# Patient Record
Sex: FEMALE | Employment: UNEMPLOYED | URBAN - METROPOLITAN AREA
[De-identification: names, ages, dates, MRNs, and addresses within clinical notes are randomized per-mention and may not be internally consistent; named-entity substitution may affect disease eponyms.]

---

## 2017-02-09 ENCOUNTER — OFFICE VISIT (OUTPATIENT)
Dept: INTERNAL MEDICINE CLINIC | Age: 42
End: 2017-02-09

## 2017-02-09 VITALS
HEIGHT: 62 IN | DIASTOLIC BLOOD PRESSURE: 98 MMHG | HEART RATE: 74 BPM | SYSTOLIC BLOOD PRESSURE: 128 MMHG | RESPIRATION RATE: 16 BRPM | WEIGHT: 185 LBS | TEMPERATURE: 98.2 F | BODY MASS INDEX: 34.04 KG/M2 | OXYGEN SATURATION: 100 %

## 2017-02-09 DIAGNOSIS — R22.9 SINGLE SKIN NODULE: Primary | ICD-10-CM

## 2017-02-09 DIAGNOSIS — R03.0 ELEVATED BLOOD PRESSURE READING WITHOUT DIAGNOSIS OF HYPERTENSION: ICD-10-CM

## 2017-02-09 DIAGNOSIS — E66.9 OBESITY, UNSPECIFIED OBESITY SEVERITY, UNSPECIFIED OBESITY TYPE: ICD-10-CM

## 2017-02-09 DIAGNOSIS — Z32.02 NEGATIVE PREGNANCY TEST: ICD-10-CM

## 2017-02-09 NOTE — PATIENT INSTRUCTIONS

## 2017-02-09 NOTE — PROGRESS NOTES
Patient is in the office today for bump on face, referral to dermatology. Patient would like a pregnancy test    Do you have an Advance Directive yes  Do you want more information no    1. Have you been to the ER, urgent care clinic since your last visit? Hospitalized since your last visit? no    2. Have you seen or consulted any other health care providers outside of the [address] [place] [address] since your last visit? Include any pap smears or colon screening.  Yes Dr. Aurelio Freedman

## 2017-02-09 NOTE — MR AVS SNAPSHOT
Visit Information Date & Time Provider Department Dept. Phone Encounter #  
 2/9/2017  4:00 PM Franny Tinajero DO [facility] [employer] [phone] Follow-up Instructions Return in about 2 weeks (around 2/23/2017) for follow up blood pressure. Upcoming Health Maintenance Date Due DTaP/Tdap/Td series (1 - Tdap) 6/16/1997 PAP AKA CERVICAL CYTOLOGY 6/16/1997 INFLUENZA AGE 9 TO ADULT 8/1/2016 Allergies as of 2/9/2017  Review Complete On: 2/9/2017 By: Nhan Sanches  
 No Known Allergies Current Immunizations  Never Reviewed No immunizations on file. Not reviewed this visit You Were Diagnosed With   
  
 Codes Comments Single skin nodule    -  Primary ICD-10-CM: R22.9 ICD-9-CM: 782.2 Elevated blood pressure reading without diagnosis of hypertension     ICD-10-CM: R03.0 ICD-9-CM: 796.2 Obesity, unspecified obesity severity, unspecified obesity type     ICD-10-CM: E66.9 ICD-9-CM: 278.00 Negative pregnancy test     ICD-10-CM: Z32.02 
ICD-9-CM: V72.41 Vitals BP Pulse Temp Resp Height(growth percentile) Weight(growth percentile) (!) 128/98 (BP 1 Location: Left arm, BP Patient Position: Sitting) 74 98.2 °F (36.8 °C) (Oral) 16 5' 2\" (1.575 m) 185 lb (83.9 kg) SpO2 BMI OB Status Smoking Status 100% 33.84 kg/m2 Having regular periods Never Smoker Vitals History BMI and BSA Data Body Mass Index Body Surface Area  
 33.84 kg/m 2 1.92 m 2 Your Updated Medication List  
  
   
This list is accurate as of: 2/9/17  4:35 PM.  Always use your most recent med list.  
  
  
  
  
 cabergoline 0.5 mg tablet Commonly known as:  DOSTINEX Take 0.5 mg by mouth two (2) times a week. 1/2 tab    Given 6/23/16  
  
 multivitamin tablet Commonly known as:  ONE A DAY Take 1 Tab by mouth daily. VITAMIN C 1,000 mg tablet Generic drug:  ascorbic acid (vitamin C) Take  by mouth. VITAMIN D3 2,000 unit Tab Generic drug:  cholecalciferol (vitamin D3) Take  by mouth. We Performed the Following REFERRAL TO DERMATOLOGY [REF19 Custom] Follow-up Instructions Return in about 2 weeks (around 2/23/2017) for follow up blood pressure. Referral Information Referral ID Referred By Referred To  
  
 [id number] Lul MATIAS Not Available Visits Status Start Date End Date 1 New Request 2/9/17 2/9/18 If your referral has a status of pending review or denied, additional information will be sent to support the outcome of this decision. Patient Instructions A Healthy Lifestyle: Care Instructions Your Care Instructions A healthy lifestyle can help you feel good, stay at a healthy weight, and have plenty of energy for both work and play. A healthy lifestyle is something you can share with your whole family. A healthy lifestyle also can lower your risk for serious health problems, such as high blood pressure, heart disease, and diabetes. You can follow a few steps listed below to improve your health and the health of your family. Follow-up care is a key part of your treatment and safety. Be sure to make and go to all appointments, and call your doctor if you are having problems. Its also a good idea to know your test results and keep a list of the medicines you take. How can you care for yourself at home? · Do not eat too much sugar, fat, or fast foods. You can still have dessert and treats now and then. The goal is moderation. · Start small to improve your eating habits. Pay attention to portion sizes, drink less juice and soda pop, and eat more fruits and vegetables. ¨ Eat a healthy amount of food. A 3-ounce serving of meat, for example, is about the size of a deck of cards. Fill the rest of your plate with vegetables and whole grains. ¨ Limit the amount of soda and sports drinks you have every day.  Drink more water when you are thirsty. ¨ Eat at least 5 servings of fruits and vegetables every day. It may seem like a lot, but it is not hard to reach this goal. A serving or helping is 1 piece of fruit, 1 cup of vegetables, or 2 cups of leafy, raw vegetables. Have an apple or some carrot sticks as an afternoon snack instead of a candy bar. Try to have fruits and/or vegetables at every meal. 
· Make exercise part of your daily routine. You may want to start with simple activities, such as walking, bicycling, or slow swimming. Try to be active 30 to 60 minutes every day. You do not need to do all 30 to 60 minutes all at once. For example, you can exercise 3 times a day for 10 or 20 minutes. Moderate exercise is safe for most people, but it is always a good idea to talk to your doctor before starting an exercise program. 
· Keep moving. Anita Ramsey the lawn, work in the garden, or Girl Meets Dress. Take the stairs instead of the elevator at work. · If you smoke, quit. People who smoke have an increased risk for heart attack, stroke, cancer, and other lung illnesses. Quitting is hard, but there are ways to boost your chance of quitting tobacco for good. ¨ Use nicotine gum, patches, or lozenges. ¨ Ask your doctor about stop-smoking programs and medicines. ¨ Keep trying. In addition to reducing your risk of diseases in the future, you will notice some benefits soon after you stop using tobacco. If you have shortness of breath or asthma symptoms, they will likely get better within a few weeks after you quit. · Limit how much alcohol you drink. Moderate amounts of alcohol (up to 2 drinks a day for men, 1 drink a day for women) are okay. But drinking too much can lead to liver problems, high blood pressure, and other health problems. Family health If you have a family, there are many things you can do together to improve your health. · Eat meals together as a family as often as possible. · Eat healthy foods. This includes fruits, vegetables, lean meats and dairy, and whole grains. · Include your family in your fitness plan. Most people think of activities such as jogging or tennis as the way to fitness, but there are many ways you and your family can be more active. Anything that makes you breathe hard and gets your heart pumping is exercise. Here are some tips: 
¨ Walk to do errands or to take your child to school or the bus. ¨ Go for a family bike ride after dinner instead of watching TV. Where can you learn more? Go to http://[employer] Groupattila.info/. Enter Q024 in the search box to learn more about \"A Healthy Lifestyle: Care Instructions. \" Current as of: July 26, 2016 Content Version: 11.1 © 5526-8105 [employer]. Care instructions adapted under license by [employer] (which disclaims liability or warranty for this information). If you have questions about a medical condition or this instruction, always ask your healthcare professional. [place] [address] any warranty or liability for your use of this information. Introducing [address]! Mika Butts introduces [employer] patient portal. Now you can access parts of your medical record, email your doctor's office, and request medication refills online. 1. In your internet browser, go to [email or web address] 2. Click on the First Time User? Click Here link in the Sign In box. You will see the New Member Sign Up page. 3. Enter your [employer] Access Code exactly as it appears below. You will not need to use this code after youve completed the sign-up process. If you do not sign up before the expiration date, you must request a new code. · [employer] Access Code: [email or web address] Expires: 5/10/2017  4:35 PM 
 
4. Enter the last four digits of your Social Security Number (xxxx) and Date of Birth (mm/dd/yyyy) as indicated and click Submit.  You will be taken to the next sign-up page. 5. Create a [employer] ID. This will be your [employer] login ID and cannot be changed, so think of one that is secure and easy to remember. 6. Create a [employer] password. You can change your password at any time. 7. Enter your Password Reset Question and Answer. This can be used at a later time if you forget your password. 8. Enter your e-mail address. You will receive e-mail notification when new information is available in [address]. 9. Click Sign Up. You can now view and download portions of your medical record. 10. Click the Download Summary menu link to download a portable copy of your medical information. If you have questions, please visit the Frequently Asked Questions section of the [employer] website. Remember, [employer] is NOT to be used for urgent needs. For medical emergencies, dial 911. Now available from your iPhone and Android! Please provide this summary of care documentation to your next provider. If you have any questions after today's visit, please call [phone].

## 2017-02-14 NOTE — PROGRESS NOTES
HISTORY OF PRESENT ILLNESS  Luisito Perdomo is a 37 y.o. female. HPI  37year old established female patient in today for a bump on her face. She is requesting a derm referral.  She says she has had this for > 1 year but has noticed it growing. This is concerning to her. Nodule is non tender per her. She also desires urine pregnancy for concern of pregnancy. She reports that dispite following an appropriate diet she has had some weight increase and fatigue over the past few weeks. 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 1/2 tab    Given 16     ascorbic acid, vitamin C, (VITAMIN C) 1,000 mg tablet Take  by mouth.  cholecalciferol, vitamin D3, (VITAMIN D3) 2,000 unit tab Take  by mouth.  multivitamin (ONE A DAY) tablet Take 1 Tab by mouth daily. No current facility-administered medications for this visit. Past Surgical History   Procedure Laterality Date    Hx gyn        x 1       Review of Systems   Eyes: Negative for blurred vision. Cardiovascular: Negative for chest pain, palpitations and leg swelling. Neurological: Negative for headaches.    See HPI    Visit Vitals    BP (!) 128/98 (BP 1 Location: Left arm, BP Patient Position: Sitting)    Pulse 74    Temp 98.2 °F (36.8 °C) (Oral)    Resp 16    Ht 5' 2\" (1.575 m)    Wt 185 lb (83.9 kg)    SpO2 100%    BMI 33.84 kg/m2     Physical Exam   Constitutional: She appears well-developed and well-nourished. Skin:   Left lower jaw with firm, mobile, nontender pea sized nodule. Psychiatric: She has a normal mood and affect. ASSESSMENT and PLAN    ICD-10-CM ICD-9-CM    1. Single skin nodule R22.9 782.2 REFERRAL TO DERMATOLOGY   2. Elevated blood pressure reading without diagnosis of hypertension R03.0 796.2    3. Obesity, unspecified obesity severity, unspecified obesity type E66.9 278.00    4. Negative pregnancy test Z32.02 V72.41      -Patient declined to discuss ways to improve and maximize meals today. -RTC 2 weeks for follow up on BP    -Patient classified as obese  -Advised continued exercise and dietary modifications.    -Patient agrees with assessment and plan    According to the CDC an increased BMI can lead to \"all-causes of death (mortality), High blood pressure (Hypertension), High LDL cholesterol, low HDL cholesterol, or high levels of triglycerides (Dyslipidemia), Type 2 diabetes, Coronary heart disease, Stroke, Gallbladder disease, Osteoarthritis (a breakdown of cartilage and bone within a joint), Sleep apnea and breathing problems, Chronic inflammation and increased oxidative stress, some cancers (endometrial, breast, colon, kidney, gallbladder, and liver), Low quality of life, Mental illness such as clinical depression, anxiety, and other mental disorders and Body pain and difficulty with physical functioning. \"    BMI Weight Status   Below 18.5 Underweight   18.5  24.9 Normal or Healthy Weight   25.0  29.9 Overweight   30.0 and Above Obese   40.0 and Above Morbid Obesity     Additional Instructions: The patient understands that they should contact the office at any time if any questions or concerns develop.   They are also aware that they can call our main office number at [phone] at any time if they would like to address any concerns with the physician. They also understand that they should dial 911 if any acute emergency arises. The patient understands that they should give us a minimum of 48 hours to complete prescription refills once they are requested. The patient has also been instructed to contact us by calling the main office number if they have not received feedback within 2 weeks of having any tests completed. The patient is a aware that they should read all package insert information when picking up the medications and that they should consult the pharmacist of a physician if they have any questions or concerns regarding the prescribed medications. Discussed with the patient new medications given and patient instructed to read pharmacy literature regarding side effects and drug interactions. Instructions for taking the medications were provided to the patient and the consequences of not taking it. Follow-up Disposition:   Return if symptoms worsen or fail to improve. Risk and benefits of new medication discussed in detail when indicated, patient was given the opportunity to ask questions   AVS provided  reviewed diet, exercise and weight control when indicated  Alarm signals discussed. ER precautions reviewed when indicated  Plan of care reviewed with patient. Understanding verbalized and they are in agreement with plan of care.      Link Shorts, DO

## 2019-09-25 ENCOUNTER — OFFICE VISIT (OUTPATIENT)
Dept: FAMILY MEDICINE CLINIC | Age: 44
End: 2019-09-25

## 2019-09-25 ENCOUNTER — HOSPITAL ENCOUNTER (OUTPATIENT)
Dept: LAB | Age: 44
Discharge: HOME | End: 2019-09-25
Payer: COMMERCIAL

## 2019-09-25 VITALS
TEMPERATURE: 98.2 F | SYSTOLIC BLOOD PRESSURE: 136 MMHG | HEIGHT: 62 IN | DIASTOLIC BLOOD PRESSURE: 92 MMHG | WEIGHT: 153.2 LBS | HEART RATE: 76 BPM | BODY MASS INDEX: 28.19 KG/M2 | RESPIRATION RATE: 16 BRPM | OXYGEN SATURATION: 100 %

## 2019-09-25 DIAGNOSIS — Z13.1 SCREENING FOR DIABETES MELLITUS: ICD-10-CM

## 2019-09-25 DIAGNOSIS — Z00.00 WELL WOMAN EXAM (NO GYNECOLOGICAL EXAM): Primary | ICD-10-CM

## 2019-09-25 DIAGNOSIS — Z13.220 SCREENING FOR HYPERLIPIDEMIA: ICD-10-CM

## 2019-09-25 DIAGNOSIS — Z13.29 SCREENING FOR THYROID DISORDER: ICD-10-CM

## 2019-09-25 DIAGNOSIS — Z13.0 SCREENING FOR DEFICIENCY ANEMIA: ICD-10-CM

## 2019-09-25 PROBLEM — N81.4 UTERINE PROLAPSE: Status: ACTIVE | Noted: 2018-12-04

## 2019-09-25 LAB
ALBUMIN SERPL-MCNC: 4 G/DL (ref 3.4–5)
ALBUMIN/GLOB SERPL: 1.2 {RATIO} (ref 0.8–1.7)
ALP SERPL-CCNC: 49 U/L (ref 45–117)
ALT SERPL-CCNC: 19 U/L (ref 13–56)
ANION GAP SERPL CALC-SCNC: 6 MMOL/L (ref 3–18)
AST SERPL-CCNC: 13 U/L (ref 10–38)
BILIRUB SERPL-MCNC: 0.5 MG/DL (ref 0.2–1)
BUN SERPL-MCNC: 16 MG/DL (ref 7–18)
BUN/CREAT SERPL: 22 (ref 12–20)
CALCIUM SERPL-MCNC: 9.8 MG/DL (ref 8.5–10.1)
CHLORIDE SERPL-SCNC: 102 MMOL/L (ref 100–111)
CO2 SERPL-SCNC: 30 MMOL/L (ref 21–32)
CREAT SERPL-MCNC: 0.72 MG/DL (ref 0.6–1.3)
GLOBULIN SER CALC-MCNC: 3.3 G/DL (ref 2–4)
GLUCOSE SERPL-MCNC: 96 MG/DL (ref 74–99)
HCT VFR BLD AUTO: 32.5 % (ref 35–45)
HGB BLD-MCNC: 10.5 G/DL (ref 12–16)
POTASSIUM SERPL-SCNC: 4.4 MMOL/L (ref 3.5–5.5)
PROT SERPL-MCNC: 7.3 G/DL (ref 6.4–8.2)
SODIUM SERPL-SCNC: 138 MMOL/L (ref 136–145)
TSH SERPL DL<=0.05 MIU/L-ACNC: 0.83 UIU/ML (ref 0.36–3.74)

## 2019-09-25 PROCEDURE — 84443 ASSAY THYROID STIM HORMONE: CPT

## 2019-09-25 PROCEDURE — 36415 COLL VENOUS BLD VENIPUNCTURE: CPT

## 2019-09-25 PROCEDURE — 85018 HEMOGLOBIN: CPT

## 2019-09-25 PROCEDURE — 80061 LIPID PANEL: CPT

## 2019-09-25 PROCEDURE — 80053 COMPREHEN METABOLIC PANEL: CPT

## 2019-09-25 NOTE — PROGRESS NOTES
Pt is here for well woman w/o pap    1. Have you been to the ER, urgent care clinic since your last visit? Hospitalized since your last visit? No    2. Have you seen or consulted any other health care providers outside of the [address] [place] [address] since your last visit? Include any pap smears or colon screening. No      Subjective:   38 y.o. female for Well Woman Check. Her gyne and breast care is done elsewhere by her Ob-Gyne physician. Patient Active Problem List    Diagnosis Date Noted    Elevated blood pressure reading without diagnosis of hypertension 2017    Hyperprolactinemia ([facility] [address] 2016    Obesity 2016     Current Outpatient Medications   Medication Sig Dispense Refill    cholecalciferol, vitamin D3, (VITAMIN D3) 2,000 unit tab Take  by mouth. 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 No TIA's or unusual headaches, no dysphagia. No prolonged cough. No dyspnea or chest pain on exertion. No abdominal pain, change in bowel habits, black or bloody stools. No urinary tract symptoms. No new or unusual musculoskeletal symptoms. Specific concerns today: none. Objective: The patient appears well, alert, oriented x 3, in no distress.   Visit Vitals  BP (!) 136/92 (BP 1 Location: Left arm)   Pulse 76   Temp 98.2 °F (36.8 °C) (Oral)   Resp 16   Ht 5' 2\" (1.575 m)   Wt 153 lb 3.2 oz (69.5 kg)   LMP 2019 (Exact Date)   SpO2 100%   BMI 28.02 kg/m²     ENT normal.  Neck supple. No adenopathy or thyromegaly. LORA. Lungs are clear, good air entry, no wheezes, rhonchi or rales. S1 and S2 normal, no murmurs, regular rate and rhythm. Abdomen soft without tenderness, guarding, mass or organomegaly. Extremities show no edema, normal peripheral pulses. Neurological is normal, no focal findings. Breast and Pelvic exams are deferred. Assessment/Plan:   Well Woman  routine labs ordered    ICD-10-CM ICD-9-CM    1. Well woman exam (no gynecological exam) Z00.00 V70.0     [V70.0]   2. Screening for deficiency anemia Z13.0 V78.1    3. Screening for thyroid disorder Z13.29 V77.0    4. Screening for hyperlipidemia Z13.220 V77.91    5. Screening for diabetes mellitus Z13.1 V77.1      PLAN:  We discussed screening recommendations. Pt declined the mammogram.  Pt declined immunizations. I have discussed the diagnosis with the patient and the intended plan as seen in the above orders. The patient has received an after-visit summary and questions were answered concerning future plans. I have discussed medication side effects and warnings with the patient as well. Patient will call for further questions. Follow-up and Dispositions    · Return in about 1 year (around 9/25/2020) for Mellisa Mccullough.

## 2019-09-26 LAB
CHOLEST SERPL-MCNC: 224 MG/DL
HDLC SERPL-MCNC: 81 MG/DL (ref 40–60)
HDLC SERPL: 2.8 {RATIO} (ref 0–5)
LDLC SERPL CALC-MCNC: 131.4 MG/DL (ref 0–100)
LIPID PROFILE,FLP: ABNORMAL
TRIGL SERPL-MCNC: 58 MG/DL (ref ?–150)
VLDLC SERPL CALC-MCNC: 11.6 MG/DL

## 2019-09-27 ENCOUNTER — TELEPHONE (OUTPATIENT)
Dept: FAMILY MEDICINE CLINIC | Age: 44
End: 2019-09-27

## 2019-09-27 NOTE — TELEPHONE ENCOUNTER
Pt states she would like to be referred to physical therapy as she has a prolaspe. She states she discussed it with her GYN and they gave her the options of surgery or doing exercises at home. Pt advised to call her GYN to ask for the referral but states she would rather get the referral from Mrs Tevin Pitts. Pt aware Jeanice Epley is out of the office until Wednesday.

## 2019-10-11 NOTE — TELEPHONE ENCOUNTER
Bry Means, NP  You 2 hours ago (8:55 AM)      Please ask Pt to call her GYN as I do no know who does physical therapy for this. If she can give me a name, I will order this for her. Pt aware to ask her GYN for a name who does PT and then call back with the name & Cristina Lovell will put in an order.

## 2019-10-25 ENCOUNTER — TELEPHONE (OUTPATIENT)
Dept: FAMILY MEDICINE CLINIC | Age: 44
End: 2019-10-25

## 2019-10-25 NOTE — TELEPHONE ENCOUNTER
Patient called to let Kassidy Castle know that she called her gyn and got the name of a PT that will do therapy for prolapse. She called [facility] at their [place] location, and will schedule appointment after receiving a order from Kassidy Castle for physical therapy. [facility] [place] phone number is [phone].

## 2019-10-29 DIAGNOSIS — N81.4 UTERINE PROLAPSE: Primary | ICD-10-CM

## 2019-10-30 NOTE — TELEPHONE ENCOUNTER
Alfonso Martínez NP  You 15 hours ago (5:32 PM)      Ref placed    Routing comment        Pt aware referral placed.

## 2019-11-27 ENCOUNTER — HOSPITAL ENCOUNTER (OUTPATIENT)
Dept: PHYSICAL THERAPY | Age: 44
Discharge: HOME | End: 2019-11-27
Payer: COMMERCIAL

## 2019-11-27 PROCEDURE — 97112 NEUROMUSCULAR REEDUCATION: CPT

## 2019-11-27 PROCEDURE — 97161 PT EVAL LOW COMPLEX 20 MIN: CPT

## 2019-11-27 NOTE — PROGRESS NOTES
Mellisa Arredondo [address]  [facility] AT [address], [facility] [address] [facility], [facility] - Phone: [phone]  Fax: [phone] / [address] [facility]  Patient Name: Prince Alvarado : 1975   Medical   Diagnosis: Uterine prolapse [N81.4] Treatment Diagnosis: Uterine prolapse [N81.4]   Onset Date: 10/29/2019     Referral Source: Patrick Vargas NP Start of Care [facility]): 2019   Prior Hospitalization: See medical history Provider #: [id number]   Prior Level of Function: Chronic symptoms since    Comorbidities: G2, P2   Medications: Verified on Patient Summary List   The Plan of Care and following information is based on the information from the initial evaluation.   ==================================================================================  Assessment / key information: Patient is a 38 y.o. yo female  with 1 vaginal delivery and 1  section who presents to [facility] with diagnosis of Uterine prolapse [N81.4]. Patient reports perineal pressure with exercising/ lifting and if she needs to strain with a bowel movement. She also has urinary incontinence with sneezing and coughing and nocturia 2x nightly. She has daily bowel movements with straining 1x weekly. She has a 11year old disabled child who requires lifting. She denies urgency or dyspareunia. Patient presents to PT with impaired strength of pelvic floor muscles scoring 3/5/5/10 on PERF. On biofeedback there was low  net rise of fast twitch contraction at 10.7 microvolts and low net rise of slow twitch contraction at 8.9 microvolts. There is a grade 2 uterine prolapse present. Patient scored 13 on FOTO/PFDI prolapse indicating decreased quality of life.   Patient can benefit from PT for retraining of muscle control on biofeedback, core strengthening and education on normal toileting techniques to increase pelvic floor muscle strength, decrease urinary incontinence, perineal pressure and nocturia.    ==================================================================================  Eval Complexity: History: MEDIUM  Complexity : 1-2 comorbidities / personal factors will impact the outcome/ POC Exam:HIGH Complexity : 4+ Standardized tests and measures addressing body structure, function, activity limitation and / or participation in recreation  Presentation: MEDIUM Complexity : Evolving with changing characteristics  Clinical Decision Making:LOW Complexity : FOTO score of 75-100Overall Complexity:LOW   Problem List: Pelvic pain/dysfunction, Decreased pelvic floor mm awareness, Decreased pelvic floor mm strength and Other  Treatment Plan may include any combination of the following: Therapeutic exercise, Neuromuscular re-education, Manual therapy, Physical agent/modality, Patient education and Other  Patient / Family readiness to learn indicated by: asking questions, trying to perform skills and interest  Persons(s) to be included in education: patient (P)  Barriers to Learning/Limitations: None  Measures taken:    Patient Goal (s): \"No prolapse\"   Patient self reported health status: good  Rehabilitation Potential: good  Short Term Goals: To be accomplished in 4 treatments:   1. Patient performing pelvic floor exercises 3x day. 2. Patient will report 20% subjective improvement in perineal pressure with ADLs. 3. Increase net rise of slow twitch contraction to 11 microvolts to increase pelvic support for ADLs such as lifting. 4. Patient using normal toileting techniques. Long Term Goals: To be accomplished in 8 treatments:   1. Patient independent in HEP     2. Patient will decrease sore on FOTO/PFDI prolapse to 10 indicating improved pelvic support and quality of life. 3. Increase net rise of slow twitch contraction to 13 microvolts to increase pelvic support for ADLs such as exercising     4.  Patient will report 50% improvement in urinary incontinence with sneezing and coughing. Frequency / Duration:   Patient to be seen  1  times per week for 8  weeks:  Patient / Caregiver education and instruction:Exercises and Other Toileting techniques  G-Codes (GP): courtney    Therapist Signature: Ashly Macedo PT Date: 08/82/8263   Certification Period: na Time: 11:29 AM   ==================================================================================  I certify that the above Physical Therapy Services are being furnished while the patient is under my care. I agree with the treatment plan and certify that this therapy is necessary. Physician Signature:        Date:       Time:     Please sign and return to [facility] or you may fax the signed copy to [phone]. Thank you.

## 2019-12-05 ENCOUNTER — HOSPITAL ENCOUNTER (OUTPATIENT)
Dept: PHYSICAL THERAPY | Age: 44
Discharge: HOME | End: 2019-12-05
Payer: COMMERCIAL

## 2019-12-05 PROCEDURE — 97112 NEUROMUSCULAR REEDUCATION: CPT

## 2019-12-05 PROCEDURE — 97110 THERAPEUTIC EXERCISES: CPT

## 2019-12-05 NOTE — PROGRESS NOTES
PELVIC FLOOR DAILY TREATMENT NOTE 8-14    Patient Name: Samia Mendiola  Date:2019  : 1975  [x]  Patient  Verified  Payor: Justin Obrien / Plan:  [facility] / Product Type: PPO /    In time: 5:45  Out time: 6:35  Total Treatment Time (min): 50  Total Timed Codes (min): 50  1:1 Treatment Time (min): 50   Visit #: 2 of 8    Treatment Area: Uterine prolapse [N81.4]    SUBJECTIVE  Pain Level (0-10 scale): 0  Any medication changes, allergies to medications, adverse drug reactions, diagnosis change, or new procedure performed?: [x] No    [] Yes (see summary sheet for update)  Subjective functional status/changes:   [] No changes reported  Patient reports doing HEP 3x day. OBJECTIVE  Modality rationale: Neuromuscular reeducation to improve the patients perineal pressure and leaking with coughing and sneezing.    Min Type Additional Details   35 [x] Biofeedback x 35 minutes    supine surface    [] Estim: []Att   []Unatt        []TENS instruct                  []IFC  []Premod   []NMES                     []Other:  []w/US   []w/ice   []w/heat  Position:  Location:    []  Traction: [] Cervical       []Lumbar                       [] Prone          []Supine                       []Intermittent   []Continuous Lbs:  [] before manual  [] after manual    []  Ultrasound: []Continuous   [] Pulsed                           []1MHz   []3MHz Location:  W/cm2:    []  Iontophoresis with dexamethasone         Location: [] Take home patch   [] In clinic    []  Ice     []  heat  []  Ice massage Position:  Location:    []  Vasopneumatic Device Pressure:       [] lo [] med [] hi   Temperature: [] lo [] med [] hi   [x] Skin assessment post-treatment:  [x]intact []redness- no adverse reaction       []redness  adverse reaction:     15 min Therapeutic Exercise:  [x] See flow sheet :  []  Pelvic floor strengthening                []  Pelvic floor downtraining  []  Quality pelvic floor contractions      []  Relaxation techniques  []  Urge suppression exercises  [x]  Other:  Core strengthening    Rationale:  to improve the patients pelvic support and decrease perineal pressure. min Manual Therapy:    Rationale:             min Patient Education: [x] Review HEP    [x] Progressed/Changed HEP based on: Increase slow twitch to 5 second holds. Add core strengthening 3x week. [] positioning   [] body mechanics   [] transfers   [] heat/ice application        Other Objective/Functional Measures:    []baseline resting tone: -   [x]slow twitch mms 18.3(13.7)   [x]fast twitch mms 23.5(15.4)    Pain Level (0-10 scale) post treatment: 0    ASSESSMENT/Changes in Function: Patient demonstrates improved strength of pelvic floor muscles with  good HEP compliance. Patient will continue to benefit from skilled PT services to modify and progress therapeutic interventions, address functional mobility deficits, address strength deficits, instruct in home and community integration and Address perineal pressure and JOSTIN to attain remaining goals. []  See Plan of Care  []  See progress note/recertification  []  See Discharge Summary         Progress towards goals / Updated goals:                 1. Patient performing pelvic floor exercises 3x day. Met                  2. Patient will report 20% subjective improvement in perineal pressure with ADLs. 3. Increase net rise of slow twitch contraction to 11 microvolts to increase pelvic support for ADLs such as lifting. Met                 4. Patient using normal toileting techniques.       PLAN  [x]  Upgrade activities as tolerated     []  Continue plan of care  []  Update interventions per flow sheet       []  Discharge due to:_  []  Other:_      Zaida Kennedy PT 12/5/2019  6:35 PM      Future Appointments   Date Time Provider Diallo Hunter   12/5/2019  5:45 PM Cydney Corral, PT [facility]   12/13/2019  8:15 AM Cydney Corral PT [facility]   12/19/2019  5:00 PM Teresitamae Holstein, PT [facility]

## 2019-12-13 ENCOUNTER — APPOINTMENT (OUTPATIENT)
Dept: PHYSICAL THERAPY | Age: 44
End: 2019-12-13
Payer: COMMERCIAL

## 2019-12-31 ENCOUNTER — HOSPITAL ENCOUNTER (OUTPATIENT)
Dept: PHYSICAL THERAPY | Age: 44
Discharge: HOME | End: 2019-12-31
Payer: COMMERCIAL

## 2019-12-31 PROCEDURE — 97110 THERAPEUTIC EXERCISES: CPT

## 2019-12-31 PROCEDURE — 97112 NEUROMUSCULAR REEDUCATION: CPT

## 2019-12-31 NOTE — PROGRESS NOTES
PELVIC FLOOR DAILY TREATMENT NOTE 8-14    Patient Name: Renetta Alicia  Date:2019  : 1975  [x]  Patient  Verified  Payor: Zaid Cristal / Plan: [facility] [place] / Product Type: PPO /    In time: 3:07 Out time: 3:51  Total Treatment Time (min): 44  Total Timed Codes (min): 44  1:1 Treatment Time (min): 44   Visit #: 3 of 8    Treatment Area: Uterine prolapse [N81.4]    SUBJECTIVE  Pain Level (0-10 scale): 0  Any medication changes, allergies to medications, adverse drug reactions, diagnosis change, or new procedure performed?: [x] No    [] Yes (see summary sheet for update)  Subjective functional status/changes:   [] No changes reported  Patient reports doing HEP 2x day. Perineal pressure is 20% improved. OBJECTIVE  Modality rationale: Neuromuscular reeducation to improve the patients perineal pressure and leaking with coughing and sneezing.    Min Type Additional Details   29 [x] Biofeedback x 29 minutes    supine surface    [] Estim: []Att   []Unatt        []TENS instruct                  []IFC  []Premod   []NMES                     []Other:  []w/US   []w/ice   []w/heat  Position:  Location:    []  Traction: [] Cervical       []Lumbar                       [] Prone          []Supine                       []Intermittent   []Continuous Lbs:  [] before manual  [] after manual    []  Ultrasound: []Continuous   [] Pulsed                           []1MHz   []3MHz Location:  W/cm2:    []  Iontophoresis with dexamethasone         Location: [] Take home patch   [] In clinic    []  Ice     []  heat  []  Ice massage Position:  Location:    []  Vasopneumatic Device Pressure:       [] lo [] med [] hi   Temperature: [] lo [] med [] hi   [x] Skin assessment post-treatment:  [x]intact []redness- no adverse reaction       []redness  adverse reaction:     15 min Therapeutic Exercise:  [x] See flow sheet :  []  Pelvic floor strengthening                []  Pelvic floor downtraining  []  Quality pelvic floor contractions      []  Relaxation techniques  []  Urge suppression exercises  [x]  Other:  Core strengthening    Rationale:  to improve the patients pelvic support and decrease perineal pressure. min Manual Therapy:    Rationale:             min Patient Education: [x] Review HEP    [x] Progressed/Changed HEP based on: Increase slow twitch to 7 second holds. Increased reps core exercises to 12 x and add quad multifidus. [] positioning   [] body mechanics   [] transfers   [] heat/ice application        Other Objective/Functional Measures:    []baseline resting tone: -   [x]slow twitch mms 30.2(22.7)   [x]fast twitch mms 31(17.2)    Pain Level (0-10 scale) post treatment: 0    ASSESSMENT/Changes in Function: See PN      Patient will continue to benefit from skilled PT services to modify and progress therapeutic interventions, address functional mobility deficits, address strength deficits, instruct in home and community integration and Address perineal pressure and JOSTIN to attain remaining goals. []  See Plan of Care  [x]  See progress note/recertification  []  See Discharge Summary         Progress towards goals / Updated goals:                 See PN      PLAN  [x]  Upgrade activities as tolerated     []  Continue plan of care  []  Update interventions per flow sheet       []  Discharge due to:_  []  Other:_      Gale Shaw, PT 12/31/2019  3:51 PM      No future appointments.

## 2019-12-31 NOTE — PROGRESS NOTES
[address]  Phone: [phone]  Fax: [phone]  PROGRESS NOTE  Patient Name: Ya Franklin : 1975   Treatment/Medical Diagnosis: Uterine prolapse [N81.4]   Referral Source: Brittni Ferris NP     Date of Initial Visit: 2019 Attended Visits: 3 Missed Visits: 0   SUMMARY OF TREATMENT  PT has consisted of pelvic floor relaxation/strengthening via biofeedback, education as to pelvic floor anatomy and function, core strengthening and home exercise program.   CURRENT STATUS  Patient has made good progress in PT with short term goals either met or progressing. Functional progress Includes patient reporting 20% improvement in perineal pressure with ADLs. Patient demonstrates improved but still impaired pelvic floor muscle strength. Goal/Measure of Progress Goal Met? 1. Patient performing pelvic floor exercises 3x day   Status at last Eval: na Current Status: 2x day progressing   2. Patient will report 20% subjective improvement in perineal pressure with ADLs. Status at last Eval: na Current Status: 20% yes   3. Increase net rise of net rise of slow twitch contraction to 11 microvolts to increase pelvic support for ADLs such as lifting. 4.  Patient using normal toileting techniques. .   Status at last Eval: 8.9  na Current Status: 22.7  ongoing Yes  progressing   New Goals to be achieved in __4__  weeks:                 1. Patient independent in HEP                   2. Patient will decrease sore on FOTO/PFDI prolapse to 10 indicating improved pelvic support and quality of life. 3. Increase net rise of slow twitch contraction to 24 microvolts to increase pelvic support for ADLs such as exercising                   4. Patient will report 50% improvement in urinary incontinence with sneezing and coughing. RECOMMENDATIONS  Continue pelvic floor PT 1x week for 4 weeks. If you have any questions/comments please contact us directly at [phone]. Thank you for allowing us to assist in the care of your patient. Therapist Signature: Allegra Patel PT Date: 12/31/2019     Time: 3:23 PM   NOTE TO PHYSICIAN:  PLEASE COMPLETE THE ORDERS BELOW AND FAX TO   [facility]: [phone]. If you are unable to process this request in 24 hours please contact our office: [phone].    ___ I have read the above report and request that my patient continue as recommended.   ___ I have read the above report and request that my patient continue therapy with the following changes/special instructions:_________________________________________________________   ___ I have read the above report and request that my patient be discharged from therapy.      Physician Signature:        Date:       Time:

## 2020-01-09 ENCOUNTER — APPOINTMENT (OUTPATIENT)
Dept: PHYSICAL THERAPY | Age: 45
End: 2020-01-09
Payer: COMMERCIAL

## 2020-01-16 ENCOUNTER — HOSPITAL ENCOUNTER (OUTPATIENT)
Dept: PHYSICAL THERAPY | Age: 45
Discharge: HOME | End: 2020-01-16
Payer: COMMERCIAL

## 2020-01-16 PROCEDURE — 97112 NEUROMUSCULAR REEDUCATION: CPT

## 2020-01-21 ENCOUNTER — HOSPITAL ENCOUNTER (OUTPATIENT)
Dept: PHYSICAL THERAPY | Age: 45
Discharge: HOME | End: 2020-01-21
Payer: COMMERCIAL

## 2020-01-21 PROCEDURE — 97112 NEUROMUSCULAR REEDUCATION: CPT

## 2020-01-21 PROCEDURE — 97110 THERAPEUTIC EXERCISES: CPT

## 2020-01-21 NOTE — PROGRESS NOTES
PELVIC FLOOR DAILY TREATMENT NOTE 8    Patient Name: Margaret Wick  Date:2020  : 1975  [x]  Patient  Verified  Payor: Jailyn Burns / Plan:  [facility] / Product Type: PPO /    In time: 4:17 Out time: 5:00  Total Treatment Time (min): 43  Total Timed Codes (min): 43  1:1 Treatment Time (min): 43   Visit #: 5 of 8    Treatment Area: Uterine prolapse [N81.4]    SUBJECTIVE  Pain Level (0-10 scale): 0  Any medication changes, allergies to medications, adverse drug reactions, diagnosis change, or new procedure performed?: [x] No    [] Yes (see summary sheet for update)  Subjective functional status/changes:   [] No changes reported  Patient reports perineal pressure a little better. OBJECTIVE  Modality rationale: Neuromuscular reeducation to improve the patients perineal pressure and leaking with coughing and sneezing.    Min Type Additional Details   23 [x] Biofeedback x 23 minutes    seated surface     [] Estim: []Att   []Unatt        []TENS instruct                  []IFC  []Premod   []NMES                     []Other:  []w/US   []w/ice   []w/heat  Position:  Location:    []  Traction: [] Cervical       []Lumbar                       [] Prone          []Supine                       []Intermittent   []Continuous Lbs:  [] before manual  [] after manual    []  Ultrasound: []Continuous   [] Pulsed                           []1MHz   []3MHz Location:  W/cm2:    []  Iontophoresis with dexamethasone         Location: [] Take home patch   [] In clinic    []  Ice     []  heat  []  Ice massage Position:  Location:    []  Vasopneumatic Device Pressure:       [] lo [] med [] hi   Temperature: [] lo [] med [] hi   [x] Skin assessment post-treatment:  [x]intact []redness- no adverse reaction       []redness  adverse reaction:     20 min Therapeutic Exercise:  [x] See flow sheet :  []  Pelvic floor strengthening                []  Pelvic floor downtraining  []  Quality pelvic floor contractions      [] Relaxation techniques  []  Urge suppression exercises  [x]  Other:  Core strengthening    Rationale:  to improve the patients pelvic support and decrease perineal pressure. min Manual Therapy:    Rationale:             min Patient Education: [x] Review HEP    [x] Progressed/Changed HEP based on: Increase slow twitch to 7 second holds. [] positioning   [] body mechanics   [] transfers   [] heat/ice application        Other Objective/Functional Measures:    []baseline resting tone: -   [x]slow twitch mms 22.5(17.2) seated   [x]fast twitch mms 26.4(14.2)    Pain Level (0-10 scale) post treatment: 0    ASSESSMENT/Changes in Function: Patient demonstrates improved strength of pelvic floor muscles with decreasing perineal pressure. Patient will continue to benefit from skilled PT services to modify and progress therapeutic interventions, address functional mobility deficits, address strength deficits, instruct in home and community integration and Address perineal pressure and JOSTIN to attain remaining goals. []  See Plan of Care  []  See progress note/recertification  []  See Discharge Summary         Progress towards goals / Updated goals:                 1. Patient independent in HEP. Progressing                   2. Patient will decrease sore on FOTO/PFDI prolapse to 10 indicating improved pelvic support and quality of life.                 3. Increase net rise of slow twitch contraction to 24 microvolts to increase pelvic support for ADLs such as exercising.   [id number]                   6. Patient will report 50% improvement in urinary incontinence with sneezing and coughing.                       PLAN  [x]  Upgrade activities as tolerated     []  Continue plan of care  []  Update interventions per flow sheet       []  Discharge due to:_  []  Other:_      Taniya Francis, PT 1/21/2020  5:00 PM      Future Appointments   Date Time Provider Diallo Hunter   1/30/2020  4:15 PM aPtsy Lovelace PT [facility]   2/6/2020  4:15 PM Garrick Melgar, PT [facility]

## 2020-01-30 ENCOUNTER — HOSPITAL ENCOUNTER (OUTPATIENT)
Dept: PHYSICAL THERAPY | Age: 45
Discharge: HOME | End: 2020-01-30
Payer: COMMERCIAL

## 2020-01-30 PROCEDURE — 97110 THERAPEUTIC EXERCISES: CPT

## 2020-01-30 PROCEDURE — 97112 NEUROMUSCULAR REEDUCATION: CPT

## 2020-01-30 NOTE — PROGRESS NOTES
PELVIC FLOOR DAILY TREATMENT NOTE 8-    Patient Name: Bhavesh Mancia  Date:2020  : 1975  [x]  Patient  Verified  Payor: BLUE CROSS / Plan: [address] [place] [address] [place] / Product Type: PPO /    In time: 4:28 Out time: 5:10  Total Treatment Time (min): 42  Total Timed Codes (min): 42  1:1 Treatment Time (min): 42   Visit #: 6 of 8    Treatment Area: Uterine prolapse [N81.4]    SUBJECTIVE  Pain Level (0-10 scale): 0  Any medication changes, allergies to medications, adverse drug reactions, diagnosis change, or new procedure performed?: [x] No    [] Yes (see summary sheet for update)  Subjective functional status/changes:   [] No changes reported  Patient reports consistent with HEP. OBJECTIVE  Modality rationale: Neuromuscular reeducation to improve the patients perineal pressure and leaking with coughing and sneezing.    Min Type Additional Details   34 [x] Biofeedback x 34 minutes    seated and standing surface     [] Estim: []Att   []Unatt        []TENS instruct                  []IFC  []Premod   []NMES                     []Other:  []w/US   []w/ice   []w/heat  Position:  Location:    []  Traction: [] Cervical       []Lumbar                       [] Prone          []Supine                       []Intermittent   []Continuous Lbs:  [] before manual  [] after manual    []  Ultrasound: []Continuous   [] Pulsed                           []1MHz   []3MHz Location:  W/cm2:    []  Iontophoresis with dexamethasone         Location: [] Take home patch   [] In clinic    []  Ice     []  heat  []  Ice massage Position:  Location:    []  Vasopneumatic Device Pressure:       [] lo [] med [] hi   Temperature: [] lo [] med [] hi   [x] Skin assessment post-treatment:  [x]intact []redness- no adverse reaction       []redness  adverse reaction:     8 min Therapeutic Exercise:  [x] See flow sheet :  []  Pelvic floor strengthening                []  Pelvic floor downtraining  []  Quality pelvic floor contractions      [] Relaxation techniques  []  Urge suppression exercises  [x]  Other:  Core strengthening    Rationale:  to improve the patients pelvic support and decrease perineal pressure. min Manual Therapy:    Rationale:             min Patient Education: [x] Review HEP    [x] Progressed/Changed HEP based on: Increase slow twitch to 10 second holds. Add 3 way hip with TA.  [] positioning   [] body mechanics   [] transfers   [] heat/ice application        Other Objective/Functional Measures:    []baseline resting tone: -   [x]slow twitch mms 23.8(20.5)   [x]fast twitch mms 24. 4(14.1)  Pain Level (0-10 scale) post treatment: 0    ASSESSMENT/Changes in Function: Patient demonstrates improved strength of slow twitch pelvic floor muscles in sitting. Progressed to standing. Patient will continue to benefit from skilled PT services to modify and progress therapeutic interventions, address functional mobility deficits, address strength deficits, instruct in home and community integration and Address perineal pressure and JOSTIN to attain remaining goals. []  See Plan of Care  []  See progress note/recertification  []  See Discharge Summary         Progress towards goals / Updated goals:                 1. Patient independent in HEP. Progressing                   2. Patient will decrease sore on FOTO/PFDI prolapse to 10 indicating improved pelvic support and quality of life.                 3. Increase net rise of slow twitch contraction to 24 microvolts to increase pelvic support for ADLs such as exercising.   [id number]                   3. Patient will report 50% improvement in urinary incontinence with sneezing and coughing.                       PLAN  [x]  Upgrade activities as tolerated     []  Continue plan of care  []  Update interventions per flow sheet       []  Discharge due to:_  []  Other:_      Mamie Causey, PT 1/30/2020  5:10 PM      Future Appointments   Date Time Provider Diallo Hunter 2/6/2020  4:15 PM Verenice Mckeon, PT [facility]   2/20/2020  5:00 PM Verneice Mckeon, PT [facility]

## 2020-02-06 ENCOUNTER — HOSPITAL ENCOUNTER (OUTPATIENT)
Dept: PHYSICAL THERAPY | Age: 45
Discharge: HOME | End: 2020-02-06
Payer: COMMERCIAL

## 2020-02-06 PROCEDURE — 97110 THERAPEUTIC EXERCISES: CPT

## 2020-02-06 PROCEDURE — 97112 NEUROMUSCULAR REEDUCATION: CPT

## 2020-02-06 NOTE — PROGRESS NOTES
PELVIC FLOOR DAILY TREATMENT NOTE 8-    Patient Name: Jadiel Galan  Date:2020  : 1975  [x]  Patient  Verified  Payor: BLUE CROSS / Plan: [address] [place] [address] [place] / Product Type: PPO /    In time: 4:24 Out time: 5:05  Total Treatment Time (min): 41  Total Timed Codes (min): 41  1:1 Treatment Time (min): 41   Visit #: 7 of 8    Treatment Area: Uterine prolapse [N81.4]    SUBJECTIVE  Pain Level (0-10 scale): 0  Any medication changes, allergies to medications, adverse drug reactions, diagnosis change, or new procedure performed?: [x] No    [] Yes (see summary sheet for update)  Subjective functional status/changes:   [] No changes reported  Patient reports that she hasn't been leaking and it's not dropping as much. OBJECTIVE  Modality rationale: Neuromuscular reeducation to improve the patients perineal pressure and leaking with coughing and sneezing.    Min Type Additional Details   31 [x] Biofeedback x 31 minutes    standing surface     [] Estim: []Att   []Unatt        []TENS instruct                  []IFC  []Premod   []NMES                     []Other:  []w/US   []w/ice   []w/heat  Position:  Location:    []  Traction: [] Cervical       []Lumbar                       [] Prone          []Supine                       []Intermittent   []Continuous Lbs:  [] before manual  [] after manual    []  Ultrasound: []Continuous   [] Pulsed                           []1MHz   []3MHz Location:  W/cm2:    []  Iontophoresis with dexamethasone         Location: [] Take home patch   [] In clinic    []  Ice     []  heat  []  Ice massage Position:  Location:    []  Vasopneumatic Device Pressure:       [] lo [] med [] hi   Temperature: [] lo [] med [] hi   [x] Skin assessment post-treatment:  [x]intact []redness- no adverse reaction       []redness  adverse reaction:     10 min Therapeutic Exercise:  [x] See flow sheet :  []  Pelvic floor strengthening                []  Pelvic floor downtraining  []  Quality pelvic floor contractions      []  Relaxation techniques  []  Urge suppression exercises  [x]  Other:  Core strengthening    Rationale:  to improve the patients pelvic support and decrease perineal pressure. min Manual Therapy:    Rationale:             min Patient Education: [x] Review HEP    [x] Progressed/Changed HEP based on: Add Hip 4 way with core and TB.  [] positioning   [] body mechanics   [] transfers   [] heat/ice application        Other Objective/Functional Measures:    []baseline resting tone: -   [x]slow twitch mms 27.2(19.5) standing   [x]fast twitch mms 31.3(16.5)  Pain Level (0-10 scale) post treatment: 0    ASSESSMENT/Changes in Function: Patient demonstrates improved strength of pelvic floor muscles with improved continnece and decreases perinal pressure. Patient will continue to benefit from skilled PT services to modify and progress therapeutic interventions, address functional mobility deficits, address strength deficits, instruct in home and community integration and Address perineal pressure and JOSTIN to attain remaining goals. []  See Plan of Care  []  See progress note/recertification  []  See Discharge Summary         Progress towards goals / Updated goals:                 1. Patient independent in HEP. Progressing                   2. Patient will decrease sore on FOTO/PFDI prolapse to 10 indicating improved pelvic support and quality of life.                 3. Increase net rise of slow twitch contraction to 24 microvolts to increase pelvic support for ADLs such as exercising.   IPAXHGWAVID                   4. Patient will report 50% improvement in urinary incontinence with sneezing and coughing.                       PLAN  [x]  Upgrade activities as tolerated     []  Continue plan of care  []  Update interventions per flow sheet       []  Discharge due to:_  []  Other:_      Radha Lutz, PT 2/6/2020  5:05 PM      Future Appointments   Date Time Provider Department Center   2/20/2020  5:00 PM Nitish Wright, PT [facility]

## 2020-02-20 ENCOUNTER — HOSPITAL ENCOUNTER (OUTPATIENT)
Dept: PHYSICAL THERAPY | Age: 45
End: 2020-02-20
Payer: COMMERCIAL

## 2020-03-13 NOTE — PROGRESS NOTES
[address], [address]  Phone: [phone]  Fax: [phone] SUMMARY  Patient Name: Jerod Benavides : 1975   Treatment/Medical Diagnosis: Uterine prolapse [N81.4]   Referral Source: Gina Morse NP     Date of Initial Visit: 2019 Attended Visits: 7 Missed Visits: 2     SUMMARY OF TREATMENT  PT has consisted of pelvic floor relaxation/strengthening via biofeedback, education as to pelvic floor anatomy and function, core strengthening and home exercise program.     CURRENT STATUS  Patient completed 7 of 8 treatments then did not return for her last treatment. Unable to reassess goals as stated below.                    1. Patient independent in HEP.                   2. Patient will decrease sore on FOTO/PFDI prolapse to 10 indicating improved pelvic support and quality of life.                 3. Increase net rise of slow twitch contraction to 24 microvolts to increase pelvic support for ADLs such as exercising.                   4. Patient will report 50% improvement in urinary incontinence with sneezing and coughing. RECOMMENDATIONS  Discontinue therapy due to lack of attendance or compliance. .  Patient to continue on home exercise program.  If you have any questions/comments please contact us directly at [phone]. Thank you for allowing us to assist in the care of your patient. Therapist Signature:  Akil Elizalde, PT Date: 2020     Time: 11:38 AM

## 2021-05-20 ENCOUNTER — TELEPHONE (OUTPATIENT)
Dept: FAMILY MEDICINE CLINIC | Age: 46
End: 2021-05-20

## 2021-05-20 NOTE — TELEPHONE ENCOUNTER
Patient was contacted to confirm date of birth because we received a request for medical records with a  that we did not have on file. Patient confirmed that her  is 1975.  Informed patient to contact [employer] to confirm her correct

## 2023-08-29 NOTE — PROGRESS NOTES
Addended by: MOLINA MYERS on: 8/29/2023 02:08 PM     Modules accepted: Orders     PELVIC FLOOR DAILY TREATMENT NOTE 8    Patient Name: Ilene Oliveros  Date:2020  : 1975  [x]  Patient  Verified  Payor: Pretty Komal / Plan:  [facility] / Product Type: PPO /    In time: 4:27 Out time: 5:28  Total Treatment Time (min): 61  Total Timed Codes (min): 61  1:1 Treatment Time (min): 41   Visit #: 4 of 8    Treatment Area: Uterine prolapse [N81.4]    SUBJECTIVE  Pain Level (0-10 scale): 0  Any medication changes, allergies to medications, adverse drug reactions, diagnosis change, or new procedure performed?: [x] No    [] Yes (see summary sheet for update)  Subjective functional status/changes:   [] No changes reported  Patient reports being consistent with HEP 3x day. Straining a little with bowel movements. OBJECTIVE  Modality rationale: Neuromuscular reeducation to improve the patients perineal pressure and leaking with coughing and sneezing. Min Type Additional Details   41 [x] Biofeedback x 41 minutes    seated surface and toileting techniques.     [] Estim: []Att   []Unatt        []TENS instruct                  []IFC  []Premod   []NMES                     []Other:  []w/US   []w/ice   []w/heat  Position:  Location:    []  Traction: [] Cervical       []Lumbar                       [] Prone          []Supine                       []Intermittent   []Continuous Lbs:  [] before manual  [] after manual    []  Ultrasound: []Continuous   [] Pulsed                           []1MHz   []3MHz Location:  W/cm2:    []  Iontophoresis with dexamethasone         Location: [] Take home patch   [] In clinic    []  Ice     []  heat  []  Ice massage Position:  Location:    []  Vasopneumatic Device Pressure:       [] lo [] med [] hi   Temperature: [] lo [] med [] hi   [x] Skin assessment post-treatment:  [x]intact []redness- no adverse reaction       []redness  adverse reaction:     20(Billed 0) min Therapeutic Exercise:  [x] See flow sheet :  []  Pelvic floor strengthening                [] Pelvic floor downtraining  []  Quality pelvic floor contractions      []  Relaxation techniques  []  Urge suppression exercises  [x]  Other:  Core strengthening    Rationale:  to improve the patients pelvic support and decrease perineal pressure. min Manual Therapy:    Rationale:             min Patient Education: [x] Review HEP    [x] Progressed/Changed HEP based on: Increased reps core exercises to 15 x and add quad UE/LE raises. [] positioning   [] body mechanics   [] transfers   [] heat/ice application        Other Objective/Functional Measures:    []baseline resting tone: -   [x]slow twitch mms 17.2(14.7) seated   [x]fast twitch mms 17.1(9.65)    Pain Level (0-10 scale) post treatment: 0    ASSESSMENT/Changes in Function: Decreased PF muscle strength in sitting. Initiated education on normal toileting techniques on biofeedback. Patient will continue to benefit from skilled PT services to modify and progress therapeutic interventions, address functional mobility deficits, address strength deficits, instruct in home and community integration and Address perineal pressure and JOSTIN to attain remaining goals. []  See Plan of Care  []  See progress note/recertification  []  See Discharge Summary         Progress towards goals / Updated goals:                 1. Patient independent in HEP. Progressing                   2. Patient will decrease sore on FOTO/PFDI prolapse to 10 indicating improved pelvic support and quality of life.                 3.  Increase net rise of slow twitch contraction to 24 microvolts to increase pelvic support for ADLs such as exercising                   4. Patient will report 50% improvement in urinary incontinence with sneezing and coughing.                       PLAN  [x]  Upgrade activities as tolerated     []  Continue plan of care  []  Update interventions per flow sheet       []  Discharge due to:_  []  Other:_      Gerald Campos, PT 1/16/2020  5:28 PM      Future Appointments   Date Time Provider Diallo Hunter   1/21/2020  4:00 PM Osmel Ogden [facility]   1/30/2020  4:15 PM Marshall Gitelman, PT [facility]   2/6/2020  4:15 PM Marshall Gitelman, PT [facility]

## 2025-05-09 NOTE — PROGRESS NOTES
PELVIC FLOOR DAILY TREATMENT NOTE 8-14    Patient Name: Genoveva Pedro  Date:2019  : 1975  [x]  Patient  Verified  Payor: [employer]UE [employer] / Plan: [address] [place] [address] [place] / Product Type: PPO /    In time:10:11  Out time:11:19  Total Treatment Time (min): 68  Total Timed Codes (min): 25  1:1 Treatment Time (min): 25   Visit #: 1 of 8    Treatment Area: Uterine prolapse [N81.4]    SUBJECTIVE  Pain Level (0-10 scale): 0  Any medication changes, allergies to medications, adverse drug reactions, diagnosis change, or new procedure performed?: [x] No    [] Yes (see summary sheet for update)  Subjective functional status/changes:   [] No changes reported  See medical history    OBJECTIVE  Modality rationale: Neuromuscular reeducation to improve the patients perineal pressure and leaking with coughing and sneezing.    Min Type Additional Details   na [x] Biofeedback x na minutes    na    [] Estim: []Att   []Unatt        []TENS instruct                  []IFC  []Premod   []NMES                     []Other:  []w/US   []w/ice   []w/heat  Position:  Location:    []  Traction: [] Cervical       []Lumbar                       [] Prone          []Supine                       []Intermittent   []Continuous Lbs:  [] before manual  [] after manual    []  Ultrasound: []Continuous   [] Pulsed                           []1MHz   []3MHz Location:  W/cm2:    []  Iontophoresis with dexamethasone         Location: [] Take home patch   [] In clinic    []  Ice     []  heat  []  Ice massage Position:  Location:    []  Vasopneumatic Device Pressure:       [] lo [] med [] hi   Temperature: [] lo [] med [] hi   [x] Skin assessment post-treatment:  [x]intact []redness- no adverse reaction       []redness  adverse reaction:     na min Therapeutic Exercise:  [x] See flow sheet :  []  Pelvic floor strengthening                []  Pelvic floor downtraining  []  Quality pelvic floor contractions      []  Relaxation techniques  []  Urge suppression exercises  []  Other:    Rationale: na to improve the patients ability to na       min Manual Therapy:    Rationale:            25 min Patient Education: [x] Review HEP    [] Progressed/Changed HEP based on: Educated Pt in pelvic floor anatomy, function/dysfunction, correct execution of a pelvic floor contraction. Reviewed biofeedback results. [facility] [] positioning   [] body mechanics   [] transfers   [] heat/ice application        Other Objective/Functional Measures:    []baseline resting tone: -   [x]slow twitch mms na   [x]fast twitch mmsna    Pain Level (0-10 scale) post treatment: 0    ASSESSMENT/Changes in Function: Justification for Eval Code Complexity:  Patient History : See POC  Examination see exam   Clinical Presentation: evolving  Clinical Decision Making : FOTO : 13 /100      Patient will continue to benefit from skilled PT services to modify and progress therapeutic interventions, address functional mobility deficits, address strength deficits, instruct in home and community integration and Address perineal pressure and JOSTIN to attain remaining goals. [x]  See Plan of Care  []  See progress note/recertification  []  See Discharge Summary         Progress towards goals / Updated goals:  Initial evaluation completed with home exercise program and education initiated.       PLAN  [x]  Upgrade activities as tolerated     []  Continue plan of care  []  Update interventions per flow sheet       []  Discharge due to:_  []  Other:_      Kandis Benavides PT 11/27/2019  11:19 AM      Future Appointments   Date Time Provider Diallo Hunter   12/5/2019  5:45 PM Scooby Elliott, PT [facility]   12/13/2019  8:15 AM Scooby Elliott PT [facility]   12/19/2019  5:00 PM Scooby Elliott, PT [facility] Resident